# Patient Record
Sex: MALE | Race: WHITE | Employment: UNEMPLOYED | URBAN - METROPOLITAN AREA
[De-identification: names, ages, dates, MRNs, and addresses within clinical notes are randomized per-mention and may not be internally consistent; named-entity substitution may affect disease eponyms.]

---

## 2017-12-05 ENCOUNTER — APPOINTMENT (OUTPATIENT)
Dept: GENERAL RADIOLOGY | Age: 37
DRG: 861 | End: 2017-12-05
Payer: MEDICAID

## 2017-12-05 ENCOUNTER — APPOINTMENT (OUTPATIENT)
Dept: CT IMAGING | Age: 37
DRG: 861 | End: 2017-12-05
Payer: MEDICAID

## 2017-12-05 ENCOUNTER — HOSPITAL ENCOUNTER (INPATIENT)
Age: 37
LOS: 1 days | Discharge: HOME | DRG: 861 | End: 2017-12-06
Attending: EMERGENCY MEDICINE | Admitting: INTERNAL MEDICINE
Payer: MEDICAID

## 2017-12-05 ENCOUNTER — APPOINTMENT (OUTPATIENT)
Dept: MRI IMAGING | Age: 37
DRG: 861 | End: 2017-12-05
Payer: MEDICAID

## 2017-12-05 DIAGNOSIS — R53.1 LEFT-SIDED WEAKNESS: Primary | ICD-10-CM

## 2017-12-05 LAB
% CKMB: 1 % (ref 0–3.5)
ABSOLUTE EOS #: 0 K/UL (ref 0–0.4)
ABSOLUTE IMMATURE GRANULOCYTE: 0 K/UL (ref 0–0.3)
ABSOLUTE LYMPH #: 0.21 K/UL (ref 1–4.8)
ABSOLUTE MONO #: 0 K/UL (ref 0.1–0.8)
ALLEN TEST: ABNORMAL
AMPHETAMINE SCREEN URINE: NEGATIVE
ANION GAP SERPL CALCULATED.3IONS-SCNC: 15 MMOL/L (ref 9–17)
ANION GAP: 9 MMOL/L (ref 7–16)
BARBITURATE SCREEN URINE: NEGATIVE
BASOPHILS # BLD: 0 % (ref 0–2)
BASOPHILS ABSOLUTE: 0 K/UL (ref 0–0.2)
BENZODIAZEPINE SCREEN, URINE: NEGATIVE
BUN BLDV-MCNC: 15 MG/DL (ref 6–20)
BUN/CREAT BLD: ABNORMAL (ref 9–20)
BUPRENORPHINE URINE: ABNORMAL
CALCIUM SERPL-MCNC: 8.4 MG/DL (ref 8.6–10.4)
CANNABINOID SCREEN URINE: NEGATIVE
CHLORIDE BLD-SCNC: 96 MMOL/L (ref 98–107)
CK MB: 1.1 NG/ML
CKMB INTERPRETATION: ABNORMAL
CO2: 21 MMOL/L (ref 20–31)
COCAINE METABOLITE, URINE: POSITIVE
CREAT SERPL-MCNC: 0.77 MG/DL (ref 0.7–1.2)
DIFFERENTIAL TYPE: ABNORMAL
EKG ATRIAL RATE: 102 BPM
EKG P AXIS: 41 DEGREES
EKG P-R INTERVAL: 126 MS
EKG Q-T INTERVAL: 334 MS
EKG QRS DURATION: 88 MS
EKG QTC CALCULATION (BAZETT): 435 MS
EKG R AXIS: 40 DEGREES
EKG T AXIS: 11 DEGREES
EKG VENTRICULAR RATE: 102 BPM
EOSINOPHILS RELATIVE PERCENT: 0 % (ref 1–4)
ESTIMATED AVERAGE GLUCOSE: 120 MG/DL
FIO2: ABNORMAL
GFR AFRICAN AMERICAN: >60 ML/MIN
GFR NON-AFRICAN AMERICAN: >60 ML/MIN
GFR NON-AFRICAN AMERICAN: >60 ML/MIN
GFR SERPL CREATININE-BSD FRML MDRD: >60 ML/MIN
GFR SERPL CREATININE-BSD FRML MDRD: ABNORMAL ML/MIN/{1.73_M2}
GFR SERPL CREATININE-BSD FRML MDRD: ABNORMAL ML/MIN/{1.73_M2}
GFR SERPL CREATININE-BSD FRML MDRD: NORMAL ML/MIN/{1.73_M2}
GLUCOSE BLD-MCNC: 110 MG/DL (ref 74–100)
GLUCOSE BLD-MCNC: 99 MG/DL (ref 70–99)
HBA1C MFR BLD: 5.8 % (ref 4–6)
HCO3 VENOUS: 22.2 MMOL/L (ref 22–29)
HCT VFR BLD CALC: 47 % (ref 40.7–50.3)
HEMOGLOBIN: 15.6 G/DL (ref 13–17)
IMMATURE GRANULOCYTES: 0 %
INR BLD: 1.1
LYMPHOCYTES # BLD: 2 % (ref 24–44)
MCH RBC QN AUTO: 29.5 PG (ref 25.2–33.5)
MCHC RBC AUTO-ENTMCNC: 33.2 G/DL (ref 28.4–34.8)
MCV RBC AUTO: 89 FL (ref 82.6–102.9)
MDMA URINE: ABNORMAL
METHADONE SCREEN, URINE: NEGATIVE
METHAMPHETAMINE, URINE: ABNORMAL
MODE: ABNORMAL
MONOCYTES # BLD: 0 % (ref 1–7)
MORPHOLOGY: NORMAL
MYOGLOBIN: <21 NG/ML (ref 28–72)
NEGATIVE BASE EXCESS, VEN: 1 (ref 0–2)
O2 DEVICE/FLOW/%: ABNORMAL
O2 SAT, VEN: 86 % (ref 60–85)
OPIATES, URINE: NEGATIVE
OXYCODONE SCREEN URINE: NEGATIVE
PARTIAL THROMBOPLASTIN TIME: 31.8 SEC (ref 21.3–31.3)
PATIENT TEMP: ABNORMAL
PCO2, VEN: 33.3 MM HG (ref 41–51)
PDW BLD-RTO: 12.9 % (ref 11.8–14.4)
PH VENOUS: 7.43 (ref 7.32–7.43)
PHENCYCLIDINE, URINE: NEGATIVE
PLATELET # BLD: 211 K/UL (ref 138–453)
PLATELET ESTIMATE: ABNORMAL
PMV BLD AUTO: 10.5 FL (ref 8.1–13.5)
PO2, VEN: 48.8 MM HG (ref 30–50)
POC CHLORIDE: 104 MMOL/L (ref 98–107)
POC CREATININE: 0.93 MG/DL (ref 0.51–1.19)
POC HEMATOCRIT: 50 % (ref 41–53)
POC HEMOGLOBIN: 17.1 G/DL (ref 13.5–17.5)
POC IONIZED CALCIUM: 1.07 MMOL/L (ref 1.15–1.33)
POC LACTIC ACID: 1.26 MMOL/L (ref 0.56–1.39)
POC PCO2 TEMP: ABNORMAL MM HG
POC PH TEMP: ABNORMAL
POC PO2 TEMP: ABNORMAL MM HG
POC POTASSIUM: 3.8 MMOL/L (ref 3.5–4.5)
POC SODIUM: 135 MMOL/L (ref 138–146)
POSITIVE BASE EXCESS, VEN: ABNORMAL (ref 0–3)
POTASSIUM SERPL-SCNC: 3.8 MMOL/L (ref 3.7–5.3)
PROPOXYPHENE, URINE: ABNORMAL
PROTHROMBIN TIME: 11.4 SEC (ref 9.4–12.6)
RBC # BLD: 5.28 M/UL (ref 4.21–5.77)
RBC # BLD: ABNORMAL 10*6/UL
SAMPLE SITE: ABNORMAL
SEG NEUTROPHILS: 98 % (ref 36–66)
SEGMENTED NEUTROPHILS ABSOLUTE COUNT: 10.19 K/UL (ref 1.8–7.7)
SODIUM BLD-SCNC: 132 MMOL/L (ref 135–144)
TEST INFORMATION: ABNORMAL
TOTAL CK: 112 U/L (ref 39–308)
TOTAL CO2, VENOUS: 23 MMOL/L (ref 23–30)
TRICYCLIC ANTIDEPRESSANTS, UR: ABNORMAL
TROPONIN INTERP: ABNORMAL
TROPONIN T: <0.03 NG/ML
TSH SERPL DL<=0.05 MIU/L-ACNC: 0.65 MIU/L (ref 0.3–5)
WBC # BLD: 10.4 K/UL (ref 3.5–11.3)
WBC # BLD: ABNORMAL 10*3/UL

## 2017-12-05 PROCEDURE — 82435 ASSAY OF BLOOD CHLORIDE: CPT

## 2017-12-05 PROCEDURE — 85014 HEMATOCRIT: CPT

## 2017-12-05 PROCEDURE — 85610 PROTHROMBIN TIME: CPT

## 2017-12-05 PROCEDURE — 83036 HEMOGLOBIN GLYCOSYLATED A1C: CPT

## 2017-12-05 PROCEDURE — 99285 EMERGENCY DEPT VISIT HI MDM: CPT

## 2017-12-05 PROCEDURE — 6360000004 HC RX CONTRAST MEDICATION: Performed by: STUDENT IN AN ORGANIZED HEALTH CARE EDUCATION/TRAINING PROGRAM

## 2017-12-05 PROCEDURE — 70450 CT HEAD/BRAIN W/O DYE: CPT

## 2017-12-05 PROCEDURE — 70498 CT ANGIOGRAPHY NECK: CPT

## 2017-12-05 PROCEDURE — 6370000000 HC RX 637 (ALT 250 FOR IP): Performed by: HOSPITALIST

## 2017-12-05 PROCEDURE — 87040 BLOOD CULTURE FOR BACTERIA: CPT

## 2017-12-05 PROCEDURE — 6360000002 HC RX W HCPCS: Performed by: STUDENT IN AN ORGANIZED HEALTH CARE EDUCATION/TRAINING PROGRAM

## 2017-12-05 PROCEDURE — 99222 1ST HOSP IP/OBS MODERATE 55: CPT | Performed by: PSYCHIATRY & NEUROLOGY

## 2017-12-05 PROCEDURE — 2580000003 HC RX 258: Performed by: HOSPITALIST

## 2017-12-05 PROCEDURE — 71010 XR CHEST PORTABLE: CPT

## 2017-12-05 PROCEDURE — 82565 ASSAY OF CREATININE: CPT

## 2017-12-05 PROCEDURE — 84132 ASSAY OF SERUM POTASSIUM: CPT

## 2017-12-05 PROCEDURE — 85730 THROMBOPLASTIN TIME PARTIAL: CPT

## 2017-12-05 PROCEDURE — 85025 COMPLETE CBC W/AUTO DIFF WBC: CPT

## 2017-12-05 PROCEDURE — 80048 BASIC METABOLIC PNL TOTAL CA: CPT

## 2017-12-05 PROCEDURE — 84484 ASSAY OF TROPONIN QUANT: CPT

## 2017-12-05 PROCEDURE — 82803 BLOOD GASES ANY COMBINATION: CPT

## 2017-12-05 PROCEDURE — 83605 ASSAY OF LACTIC ACID: CPT

## 2017-12-05 PROCEDURE — 70551 MRI BRAIN STEM W/O DYE: CPT

## 2017-12-05 PROCEDURE — 80307 DRUG TEST PRSMV CHEM ANLYZR: CPT

## 2017-12-05 PROCEDURE — 84443 ASSAY THYROID STIM HORMONE: CPT

## 2017-12-05 PROCEDURE — 2060000000 HC ICU INTERMEDIATE R&B

## 2017-12-05 PROCEDURE — 70496 CT ANGIOGRAPHY HEAD: CPT

## 2017-12-05 PROCEDURE — 82550 ASSAY OF CK (CPK): CPT

## 2017-12-05 PROCEDURE — 2580000003 HC RX 258: Performed by: STUDENT IN AN ORGANIZED HEALTH CARE EDUCATION/TRAINING PROGRAM

## 2017-12-05 PROCEDURE — 82553 CREATINE MB FRACTION: CPT

## 2017-12-05 PROCEDURE — 83874 ASSAY OF MYOGLOBIN: CPT

## 2017-12-05 PROCEDURE — 84295 ASSAY OF SERUM SODIUM: CPT

## 2017-12-05 PROCEDURE — 93005 ELECTROCARDIOGRAM TRACING: CPT

## 2017-12-05 PROCEDURE — 82947 ASSAY GLUCOSE BLOOD QUANT: CPT

## 2017-12-05 PROCEDURE — 82330 ASSAY OF CALCIUM: CPT

## 2017-12-05 RX ORDER — SODIUM CHLORIDE 0.9 % (FLUSH) 0.9 %
10 SYRINGE (ML) INJECTION PRN
Status: DISCONTINUED | OUTPATIENT
Start: 2017-12-05 | End: 2017-12-06

## 2017-12-05 RX ORDER — MORPHINE SULFATE 4 MG/ML
4 INJECTION, SOLUTION INTRAMUSCULAR; INTRAVENOUS
Status: DISCONTINUED | OUTPATIENT
Start: 2017-12-05 | End: 2017-12-05

## 2017-12-05 RX ORDER — SODIUM CHLORIDE 0.9 % (FLUSH) 0.9 %
10 SYRINGE (ML) INJECTION PRN
Status: DISCONTINUED | OUTPATIENT
Start: 2017-12-05 | End: 2017-12-06 | Stop reason: HOSPADM

## 2017-12-05 RX ORDER — SODIUM CHLORIDE 0.9 % (FLUSH) 0.9 %
10 SYRINGE (ML) INJECTION EVERY 12 HOURS SCHEDULED
Status: DISCONTINUED | OUTPATIENT
Start: 2017-12-05 | End: 2017-12-06

## 2017-12-05 RX ORDER — ASPIRIN 325 MG
325 TABLET ORAL ONCE
Status: COMPLETED | OUTPATIENT
Start: 2017-12-05 | End: 2017-12-05

## 2017-12-05 RX ORDER — ASPIRIN 81 MG/1
81 TABLET ORAL DAILY
Status: DISCONTINUED | OUTPATIENT
Start: 2017-12-06 | End: 2017-12-06 | Stop reason: HOSPADM

## 2017-12-05 RX ORDER — SODIUM CHLORIDE 0.9 % (FLUSH) 0.9 %
10 SYRINGE (ML) INJECTION EVERY 12 HOURS SCHEDULED
Status: DISCONTINUED | OUTPATIENT
Start: 2017-12-05 | End: 2017-12-06 | Stop reason: HOSPADM

## 2017-12-05 RX ORDER — ATORVASTATIN CALCIUM 40 MG/1
40 TABLET, FILM COATED ORAL NIGHTLY
Status: DISCONTINUED | OUTPATIENT
Start: 2017-12-05 | End: 2017-12-06 | Stop reason: HOSPADM

## 2017-12-05 RX ORDER — MORPHINE SULFATE 2 MG/ML
2 INJECTION, SOLUTION INTRAMUSCULAR; INTRAVENOUS
Status: DISCONTINUED | OUTPATIENT
Start: 2017-12-05 | End: 2017-12-05

## 2017-12-05 RX ORDER — SODIUM CHLORIDE 0.9 % (FLUSH) 0.9 %
10 SYRINGE (ML) INJECTION EVERY 12 HOURS SCHEDULED
Status: DISCONTINUED | OUTPATIENT
Start: 2017-12-05 | End: 2017-12-05

## 2017-12-05 RX ORDER — ONDANSETRON 2 MG/ML
4 INJECTION INTRAMUSCULAR; INTRAVENOUS EVERY 6 HOURS PRN
Status: DISCONTINUED | OUTPATIENT
Start: 2017-12-05 | End: 2017-12-06 | Stop reason: HOSPADM

## 2017-12-05 RX ORDER — OXYCODONE HYDROCHLORIDE AND ACETAMINOPHEN 5; 325 MG/1; MG/1
2 TABLET ORAL EVERY 4 HOURS PRN
Status: DISCONTINUED | OUTPATIENT
Start: 2017-12-05 | End: 2017-12-05

## 2017-12-05 RX ORDER — ACETAMINOPHEN 325 MG/1
650 TABLET ORAL EVERY 4 HOURS PRN
Status: DISCONTINUED | OUTPATIENT
Start: 2017-12-05 | End: 2017-12-06

## 2017-12-05 RX ORDER — HYDROCODONE BITARTRATE AND ACETAMINOPHEN 5; 325 MG/1; MG/1
1 TABLET ORAL EVERY 6 HOURS PRN
Status: DISCONTINUED | OUTPATIENT
Start: 2017-12-05 | End: 2017-12-06 | Stop reason: HOSPADM

## 2017-12-05 RX ORDER — ACETAMINOPHEN 325 MG/1
650 TABLET ORAL EVERY 4 HOURS PRN
Status: DISCONTINUED | OUTPATIENT
Start: 2017-12-05 | End: 2017-12-05

## 2017-12-05 RX ORDER — ONDANSETRON 2 MG/ML
4 INJECTION INTRAMUSCULAR; INTRAVENOUS EVERY 8 HOURS PRN
Status: DISCONTINUED | OUTPATIENT
Start: 2017-12-05 | End: 2017-12-06

## 2017-12-05 RX ORDER — ACETAMINOPHEN 325 MG/1
650 TABLET ORAL EVERY 4 HOURS PRN
Status: DISCONTINUED | OUTPATIENT
Start: 2017-12-05 | End: 2017-12-06 | Stop reason: HOSPADM

## 2017-12-05 RX ORDER — OXYCODONE HYDROCHLORIDE AND ACETAMINOPHEN 5; 325 MG/1; MG/1
1 TABLET ORAL EVERY 4 HOURS PRN
Status: DISCONTINUED | OUTPATIENT
Start: 2017-12-05 | End: 2017-12-05

## 2017-12-05 RX ORDER — SODIUM CHLORIDE 0.9 % (FLUSH) 0.9 %
10 SYRINGE (ML) INJECTION PRN
Status: DISCONTINUED | OUTPATIENT
Start: 2017-12-05 | End: 2017-12-05

## 2017-12-05 RX ORDER — SODIUM CHLORIDE 9 MG/ML
INJECTION, SOLUTION INTRAVENOUS CONTINUOUS
Status: DISCONTINUED | OUTPATIENT
Start: 2017-12-05 | End: 2017-12-06 | Stop reason: HOSPADM

## 2017-12-05 RX ADMIN — SODIUM CHLORIDE: 9 INJECTION, SOLUTION INTRAVENOUS at 17:50

## 2017-12-05 RX ADMIN — TAZOBACTAM SODIUM AND PIPERACILLIN SODIUM 3.38 G: 375; 3 INJECTION, SOLUTION INTRAVENOUS at 17:49

## 2017-12-05 RX ADMIN — VANCOMYCIN HYDROCHLORIDE 1250 MG: 1 INJECTION, POWDER, LYOPHILIZED, FOR SOLUTION INTRAVENOUS at 19:12

## 2017-12-05 RX ADMIN — Medication 10 ML: at 21:12

## 2017-12-05 RX ADMIN — IOPAMIDOL 90 ML: 755 INJECTION, SOLUTION INTRAVENOUS at 17:04

## 2017-12-05 RX ADMIN — ASPIRIN 325 MG: 325 TABLET, COATED ORAL at 17:50

## 2017-12-05 RX ADMIN — ENOXAPARIN SODIUM 40 MG: 40 INJECTION SUBCUTANEOUS at 22:49

## 2017-12-05 ASSESSMENT — ENCOUNTER SYMPTOMS
COLOR CHANGE: 0
COUGH: 0
PHOTOPHOBIA: 0
DIARRHEA: 0
NAUSEA: 0
BLOOD IN STOOL: 0
SINUS PAIN: 0
CHOKING: 0
WHEEZING: 0
CONSTIPATION: 0
VOICE CHANGE: 0
ABDOMINAL DISTENTION: 0
CHEST TIGHTNESS: 0
SHORTNESS OF BREATH: 0
SINUS PRESSURE: 0
ABDOMINAL PAIN: 0
TROUBLE SWALLOWING: 0
SORE THROAT: 0
VOMITING: 0

## 2017-12-05 ASSESSMENT — PAIN SCALES - GENERAL
PAINLEVEL_OUTOF10: 0
PAINLEVEL_OUTOF10: 0

## 2017-12-05 NOTE — ED PROVIDER NOTES
[facility] [address] [facility]  Emergency Department  Emergency Medicine Resident Sign-out     Care of Mame Villanueva was assumed from Dr. Lori Lind and is being seen for Fatigue  . The patient's initial evaluation and plan have been discussed with the prior provider who initially evaluated the patient.      EMERGENCY DEPARTMENT COURSE / MEDICAL DECISION MAKING:       MEDICATIONS GIVEN:  Orders Placed This Encounter   Medications    iopamidol (ISOVUE-370) 76 % injection 90 mL    sodium chloride flush 0.9 % injection 10 mL    sodium chloride flush 0.9 % injection 10 mL    acetaminophen (TYLENOL) tablet 650 mg    magnesium hydroxide (MILK OF MAGNESIA) 400 MG/5ML suspension 30 mL    ondansetron (ZOFRAN) injection 4 mg    enoxaparin (LOVENOX) injection 40 mg    aspirin EC tablet 81 mg    atorvastatin (LIPITOR) tablet 40 mg    aspirin tablet 325 mg    0.9 % sodium chloride infusion    piperacillin-tazobactam (ZOSYN) 3.375 g in dextrose 50 mL IVPB (premix)    vancomycin (VANCOCIN) 1,250 mg in dextrose 5 % 250 mL IVPB    vancomycin (VANCOCIN) intermittent dosing (placeholder)       LABS / RADIOLOGY:     Labs Reviewed   SODIUM (POC) - Abnormal; Notable for the following:        Result Value    POC Sodium 135 (*)     All other components within normal limits   CALCIUM, IONIC (POC) - Abnormal; Notable for the following:     POC Ionized Calcium 1.07 (*)     All other components within normal limits   STROKE PANEL - Abnormal; Notable for the following:     Seg Neutrophils 98 (*)     Lymphocytes 2 (*)     Monocytes 0 (*)     Eosinophils % 0 (*)     Segs Absolute 10.19 (*)     Absolute Lymph # 0.21 (*)     Absolute Mono # 0.00 (*)     PTT 31.8 (*)     Myoglobin <21 (*)     Calcium 8.4 (*)     Sodium 132 (*)     Chloride 96 (*)     All other components within normal limits   URINE DRUG SCREEN - Abnormal; Notable for the following:     Cocaine Metabolite, Urine POSITIVE (*)     All other components within normal limits images are provided for review. Stenosis of the internal carotid arteries measured using NASCET criteria. Dose modulation, iterative reconstruction, and/or weight based adjustment of the mA/kV was utilized to reduce the radiation dose to as low as reasonably achievable. COMPARISON: None. HISTORY: ORDERING SYSTEM PROVIDED HISTORY: STROKE Initial encounter. Acute illness. Acute onset left-sided weakness. FINDINGS: CTA NECK: AORTIC ARCH/GREAT VESSELS: There is a normal branch pattern of the aortic arch. No significant stenosis is seen of the innominate artery or subclavian arteries. CAROTID ARTERIES: There is mild atherosclerotic plaque at the carotid bifurcations causing less than 25% stenosis of the proximal internal carotid arteries. The common, internal, and external carotid arteries are otherwise normal in course caliber without dissection, pseudoaneurysm, or thrombosis. VERTEBRAL ARTERIES: The vertebral arteries both arise from the subclavian arteries and are normal in caliber without evidence of flow limiting stenosis or dissection. SOFT TISSUES: The lung apices are clear. No cervical or superior mediastinal lymphadenopathy. The visualized portion of the larynx and pharynx appear unremarkable. The parotid, submandibular and thyroid glands demonstrate no acute abnormality. BONES: There is no acute fracture or suspect osseous lesion. Mild degenerative changes are present in the cervical spine greatest at C5-6. CTA HEAD: ANTERIOR CIRCULATION: The internal carotid arteries are normal in course and caliber without focal stenosis. The anterior cerebral and middle cerebral arteries demonstrate no focal stenosis. POSTERIOR CIRCULATION: The posterior cerebral arteries demonstrate no focal stenosis. The vertebral and basilar arteries appear unremarkable. Bilateral posterior communicating arteries are present. ANEURYSM: No intracranial aneurysm is seen.  BRAIN: No abnormal intracranial enhancement or vascular malformation. 1. No acute arterial abnormality or hemodynamically significant arterial stenosis in the head or neck. 2. Mild atherosclerotic plaque at the carotid bifurcations causing less than 25% stenosis of the proximal internal carotid arteries. Findings were discussed with Dr. Jeni Crocker at 5:28 pm on 12/5/2017. RECENT VITALS:     Temp: 99.5 °F (37.5 °C),  Pulse: 101, Resp: 24, BP: (!) 150/100, SpO2: 98 %    This patient is a 30 y.o. Male with Stroke like symptoms. Patient was followed by the stroke team.  He has CT scan of the head, CT of the head and neck without any acute findings. Patient has been admitted to medicine for further stroke workup plan is for MRI. Patient had a recent dental procedure and was on penicillin. [address] that this may be an emboli secondary to endocarditis and easily get an echocardiogram and further workup concerned that as well. He was given Zosyn here in the emergency department. Daily dose vancomycin. We'll continue to monitor on the patient goes upstairs. ED Course        OUTSTANDING TASKS / RECOMMENDATIONS:    1. Abdomen no outstanding labs, MRI pending  2. FINAL IMPRESSION:     1. Left-sided weakness        DISPOSITION:         DISPOSITION:  []  Discharge   []  Transfer -    [x]  Admission -  Internal medicine   []  Against Medical Advice   []  Eloped   FOLLOW-UP: No follow-up provider specified.    DISCHARGE MEDICATIONS: New Prescriptions    No medications on file          Justin Rivas MD  Emergency Medicine Resident  [id number] Zach Vaughan MD  Resident  12/05/17 Robina Mcdonald

## 2017-12-05 NOTE — ED NOTES
Bed: 24  Expected date:   Expected time:   Means of arrival:   Comments:     Eunice Mendez RN  12/05/17 [id number]

## 2017-12-05 NOTE — ED PROVIDER NOTES
level 4, 10 or more for level 5)      Review of Systems   Constitutional: Negative for activity change, appetite change, fatigue and fever. HENT: Negative for congestion, sinus pain, sinus pressure, sneezing, sore throat, trouble swallowing and voice change. Eyes: Negative for photophobia and visual disturbance. Respiratory: Negative for cough, choking, chest tightness, shortness of breath and wheezing. Cardiovascular: Negative for chest pain and palpitations. Gastrointestinal: Negative for abdominal distention, abdominal pain, blood in stool, constipation, diarrhea, nausea and vomiting. Endocrine: Negative for polydipsia, polyphagia and polyuria. Genitourinary: Negative for difficulty urinating, dysuria, flank pain, frequency and urgency. Musculoskeletal: Negative for arthralgias, gait problem, joint swelling, myalgias and neck stiffness. Skin: Negative for color change and pallor. Neurological: Positive for dizziness and weakness. Negative for syncope, facial asymmetry, light-headedness, numbness and headaches. PHYSICAL EXAM   (up to 7 for level 4, 8 or more for level 5)      BP (!) 150/100   Pulse 101   Temp 99.5 °F (37.5 °C) (Oral)   Resp 24   Ht 6' (1.829 m)   Wt 225 lb (102.1 kg)   SpO2 98%   BMI 30.52 kg/m²     Physical Exam   Constitutional: He is oriented to person, place, and time. He appears well-developed and well-nourished. No distress. HENT:   Head: Normocephalic and atraumatic. Right Ear: External ear normal.   Left Ear: External ear normal.   Eyes: EOM are normal. Pupils are equal, round, and reactive to light. Neck: Normal range of motion. No JVD present. No tracheal deviation present. Cardiovascular: Normal rate, normal heart sounds and intact distal pulses. Exam reveals no gallop and no friction rub. No murmur heard. Pulmonary/Chest: Effort normal and breath sounds normal. No respiratory distress. He has no wheezes. Abdominal: Soft.  He exhibits no distension. There is no tenderness. Musculoskeletal: Normal range of motion. He exhibits no deformity. We decided to both left upper and lower extremities - 4+ with 5+ strength on the right side. Neurological: He is alert and oriented to person, place, and time. Skin: Skin is warm and dry. He is not diaphoretic. DIFFERENTIAL  DIAGNOSIS     PLAN (LABS / IMAGING / EKG):  Orders Placed This Encounter   Procedures    CULTURE BLOOD #2    CULTURE BLOOD #1    CULTURE BLOOD #1    CT HEAD WO CONTRAST    CTA HEAD W CONTRAST    CTA NECK W CONTRAST    XR Chest Portable    Hemoglobin and hematocrit, blood    SODIUM (POC)    POTASSIUM (POC)    CHLORIDE (POC)    CALCIUM, IONIC (POC)    STROKE PANEL    Lipid panel - fasting    Troponin    TSH with Reflex    LIPID PANEL    HEMOGLOBIN A1C    DRUG SCREEN MULTI URINE    Vital signs    Telemetry monitoring    Neuro checks    Tobacco cessation education    Swallow screen by nursing before diet and oral medications started.     Stroke education    Vital signs per unit routine    Notify Physician    Elevate Head of Bed     Nursing swallow assessment    Nursing communication    Full Code    Inpatient consult to Neurology    Inpatient Consult to Neurology    Pharmacy to Dose: Vancomycin    Inpatient consult to Internal Medicine    OT eval and treat    PT evaluation and treat    Initiate Oxygen Therapy Protocol    Venous Blood Gas, POC    Creatinine W/GFR Point of Care    Lactic Acid, POC    POCT Glucose    Anion Gap (Calc) POC    POCT troponin    EKG 12 Lead    ECHO Complete 2D W Doppler W Color    ECHO Complete 2D W Doppler W Color       MEDICATIONS ORDERED:  Orders Placed This Encounter   Medications    iopamidol (ISOVUE-370) 76 % injection 90 mL    sodium chloride flush 0.9 % injection 10 mL    sodium chloride flush 0.9 % injection 10 mL    acetaminophen (TYLENOL) tablet 650 mg    magnesium hydroxide (MILK OF MAGNESIA) 400 MG/5ML suspension 30 mL    ondansetron (ZOFRAN) injection 4 mg    enoxaparin (LOVENOX) injection 40 mg    aspirin EC tablet 81 mg    atorvastatin (LIPITOR) tablet 40 mg    aspirin tablet 325 mg    0.9 % sodium chloride infusion    piperacillin-tazobactam (ZOSYN) 3.375 g in dextrose 50 mL IVPB (premix)       DDX:     AMS DDX:   Evaluate for ingestion, infectious, trauma, seizure, AMS, electrolytes, encephalopathy, insulin, opiates, uremia, toxins, tumor, thyrotoxicosis, psychiatric, sepsis and stroke. Stroke/ TIA/ Facial Droop DDX:   Stroke/ TIA, Vascular, Infectious/inflammatory, Neoplastic/neurological, Drug induced, Iatrogenic, Cardiopulmonary, Autoimmune, Endocrine, Degenerative, Psychogenic/psychiatric, MSK - special concern for endocarditis    INITIAL NIH STROKE SCALE:    Time Performed:  9815 PM     1a. Level of consciousness:  0 - alert; keenly responsive  1b. Level of consciousness questions:  0 - answers both questions correctly  1c. Level of consciousness questions:  0 - performs both tasks correctly  2. Best Gaze:  0 - normal  3. Visual:  0 - no visual loss  4. Facial Palsy:  0 - normal symmetric movement  5a. Motor left arm:  1 - drift, limb holds 90 (or 45) degrees but drifts down before full 10 seconds: does not hit bed  5b. Motor right arm:  0 - no drift, limb holds 90 (or 45) degrees for full 10 seconds  6a. Motor left le - drift; leg falls by the end of the 5 second period but does not hit bed  6b. Motor right le - no drift; leg holds 30 degree position for full 5 seconds  7. Limb Ataxia:  0 - absent  8. Sensory:  0 - normal; no sensory loss  9. Best Language:  0 - no aphasia, normal  10. Dysarthria:  0 - normal  11.   Extinction and Inattention:  0 - no abnormality    TOTAL:  2    DIAGNOSTIC RESULTS / EMERGENCY DEPARTMENT COURSE / MDM     LABS:  Results for orders placed or performed during the hospital encounter of 17   Hemoglobin and hematocrit, HISTORY: ORDERING SYSTEM PROVIDED HISTORY: STROKE TECHNOLOGIST PROVIDED HISTORY: Has a \"code stroke\" or \"stroke alert\" been called? ->Yes Initial encounter. Acute illness. Acute onset left upper and lower extremity weakness. FINDINGS: BRAIN/VENTRICLES: There is no acute intracranial hemorrhage, mass effect or midline shift. No abnormal extra-axial fluid collection. The gray-white differentiation is maintained without evidence of an acute infarct. There is no evidence of hydrocephalus. ORBITS: The visualized portion of the orbits demonstrate no acute abnormality. SINUSES: The visualized paranasal sinuses and mastoid air cells demonstrate no acute abnormality. SOFT TISSUES/SKULL:  No acute abnormality of the visualized skull or soft tissues. No acute intracranial abnormality. Findings were discussed with Dr. Marcella Luu at 5:09 pm on 12/5/2017. Xr Chest Portable    Result Date: 12/5/2017  EXAMINATION: SINGLE VIEW OF THE CHEST 12/5/2017 5:00 pm COMPARISON: None. HISTORY: ORDERING SYSTEM PROVIDED HISTORY: chest pain, stroke priority TECHNOLOGIST PROVIDED HISTORY: Reason for exam:->chest pain, stroke priority FINDINGS: The heart size at the upper limits of normal.  Bibasilar hypoaeration. No pulmonary edema. No pleural effusion. 1. Bibasilar hypoaeration 2. Otherwise, no acute abnormalities seen in the chest     Cta Neck W Contrast    Result Date: 12/5/2017  EXAMINATION: CTA OF THE HEAD WITH CONTRAST; CTA OF THE NECK 12/5/2017 5:18 pm: TECHNIQUE: CTA of the head/brain was performed with the administration of intravenous contrast. Multiplanar reformatted images are provided for review. MIP images are provided for review.  Dose modulation, iterative reconstruction, and/or weight based adjustment of the mA/kV was utilized to reduce the radiation dose to as low as reasonably achievable.; CTA of the neck was performed with the administration of intravenous contrast. Multiplanar reformatted images are provided for review. MIP images are provided for review. Stenosis of the internal carotid arteries measured using NASCET criteria. Dose modulation, iterative reconstruction, and/or weight based adjustment of the mA/kV was utilized to reduce the radiation dose to as low as reasonably achievable. COMPARISON: None. HISTORY: ORDERING SYSTEM PROVIDED HISTORY: STROKE Initial encounter. Acute illness. Acute onset left-sided weakness. FINDINGS: CTA NECK: AORTIC ARCH/GREAT VESSELS: There is a normal branch pattern of the aortic arch. No significant stenosis is seen of the innominate artery or subclavian arteries. CAROTID ARTERIES: There is mild atherosclerotic plaque at the carotid bifurcations causing less than 25% stenosis of the proximal internal carotid arteries. The common, internal, and external carotid arteries are otherwise normal in course caliber without dissection, pseudoaneurysm, or thrombosis. VERTEBRAL ARTERIES: The vertebral arteries both arise from the subclavian arteries and are normal in caliber without evidence of flow limiting stenosis or dissection. SOFT TISSUES: The lung apices are clear. No cervical or superior mediastinal lymphadenopathy. The visualized portion of the larynx and pharynx appear unremarkable. The parotid, submandibular and thyroid glands demonstrate no acute abnormality. BONES: There is no acute fracture or suspect osseous lesion. Mild degenerative changes are present in the cervical spine greatest at C5-6. CTA HEAD: ANTERIOR CIRCULATION: The internal carotid arteries are normal in course and caliber without focal stenosis. The anterior cerebral and middle cerebral arteries demonstrate no focal stenosis. POSTERIOR CIRCULATION: The posterior cerebral arteries demonstrate no focal stenosis. The vertebral and basilar arteries appear unremarkable. Bilateral posterior communicating arteries are present. ANEURYSM: No intracranial aneurysm is seen.  BRAIN: No abnormal intracranial enhancement or vascular malformation. 1. No acute arterial abnormality or hemodynamically significant arterial stenosis in the head or neck. 2. Mild atherosclerotic plaque at the carotid bifurcations causing less than 25% stenosis of the proximal internal carotid arteries. Findings were discussed with Dr. Khari Weir at 5:28 pm on 12/5/2017. Cta Head W Contrast    Result Date: 12/5/2017  EXAMINATION: CTA OF THE HEAD WITH CONTRAST; CTA OF THE NECK 12/5/2017 5:18 pm: TECHNIQUE: CTA of the head/brain was performed with the administration of intravenous contrast. Multiplanar reformatted images are provided for review. MIP images are provided for review. Dose modulation, iterative reconstruction, and/or weight based adjustment of the mA/kV was utilized to reduce the radiation dose to as low as reasonably achievable.; CTA of the neck was performed with the administration of intravenous contrast. Multiplanar reformatted images are provided for review. MIP images are provided for review. Stenosis of the internal carotid arteries measured using NASCET criteria. Dose modulation, iterative reconstruction, and/or weight based adjustment of the mA/kV was utilized to reduce the radiation dose to as low as reasonably achievable. COMPARISON: None. HISTORY: ORDERING SYSTEM PROVIDED HISTORY: STROKE Initial encounter. Acute illness. Acute onset left-sided weakness. FINDINGS: CTA NECK: AORTIC ARCH/GREAT VESSELS: There is a normal branch pattern of the aortic arch. No significant stenosis is seen of the innominate artery or subclavian arteries. CAROTID ARTERIES: There is mild atherosclerotic plaque at the carotid bifurcations causing less than 25% stenosis of the proximal internal carotid arteries. The common, internal, and external carotid arteries are otherwise normal in course caliber without dissection, pseudoaneurysm, or thrombosis.  VERTEBRAL ARTERIES: The vertebral arteries both arise from the subclavian arteries and are normal in caliber without evidence of flow limiting stenosis or dissection. SOFT TISSUES: The lung apices are clear. No cervical or superior mediastinal lymphadenopathy. The visualized portion of the larynx and pharynx appear unremarkable. The parotid, submandibular and thyroid glands demonstrate no acute abnormality. BONES: There is no acute fracture or suspect osseous lesion. Mild degenerative changes are present in the cervical spine greatest at C5-6. CTA HEAD: ANTERIOR CIRCULATION: The internal carotid arteries are normal in course and caliber without focal stenosis. The anterior cerebral and middle cerebral arteries demonstrate no focal stenosis. POSTERIOR CIRCULATION: The posterior cerebral arteries demonstrate no focal stenosis. The vertebral and basilar arteries appear unremarkable. Bilateral posterior communicating arteries are present. ANEURYSM: No intracranial aneurysm is seen. BRAIN: No abnormal intracranial enhancement or vascular malformation. 1. No acute arterial abnormality or hemodynamically significant arterial stenosis in the head or neck. 2. Mild atherosclerotic plaque at the carotid bifurcations causing less than 25% stenosis of the proximal internal carotid arteries. Findings were discussed with Dr. Marina Geiger at 5:28 pm on 12/5/2017.      EKG  Rhythm: normal sinus   Rate: normal  Axis: normal  Ectopy: none  Conduction: normal  ST Segments: no acute change  T Waves: no acute change  Q Waves: none    Clinical Impression: no acute changes and non-specific EKG    Karly Rendon MD  Emergency Medicine Resident    All EKG's are interpreted by the Emergency Department Physician who either signs or Co-signs this chart in the absence of a cardiologist.    EMERGENCY DEPARTMENT COURSE:  Physical exam  Stroke priority  CT head with CTA head and neck  Echocardiogram  Blood cultures x 3  Antibiotics for endocarditis risk  Admission to neuro stepdown per medicine  MRI brain    PROCEDURES:  None    CONSULTS:  IP CONSULT TO NEUROLOGY  IP CONSULT TO NEUROLOGY  PHARMACY TO DOSE VANCOMYCIN  IP CONSULT TO INTERNAL MEDICINE    FINAL IMPRESSION      1.  Left-sided weakness           DISPOSITION / PLAN     Disposition: Neuro stepdown admission      Marianne Mcclendon MD  Emergency Medicine Resident    (Please note that portions of this note were completed with a voice recognition program.  Efforts were made to edit the dictations but occasionally words are mis-transcribed.)       Leonel Ignacio MD  Resident  12/05/17 [id number]

## 2017-12-05 NOTE — CONSULTS
Neuro Critical Care   []Stroke Alert  []Stroke Priority  []Stroke Consult   paged @   ER Room # 24  Arrival to patient bedside @       Pt Name: Clarence Gallardo  MRN: [id number]  YOB: 1980  Allergies:has No Known Allergies. Primary Care Physician: No primary care provider on file. Clarence Gallardo is a 30 y.o. male who presents with stroke like symptoms with weakness on left side of his body. Apparently patient was sick last night took tylenol and didn't get sleep all night. He woke this morning had breakfast last well known at around 9.00 am in morning, was driving to [place], he felt dizzy , nauseated and threw up , later he felt as if he lost control over his body and pulled his car to a side. He lost consciousness and woke 4 hrs later to find his left side of body weak. A stroke priority was called. Symptom onset has been acute for a time period of  minute(s). Severity is described as moderate. Course of his symptoms over time is insidious. Had a dental procedure last Saturday. Past Medical History   has no past medical history on file. Imaging:  CT brain without contrast: No hemorrhage    CTA/CTP imaging: no large vessel oclussion    MRI brain  without contrast: pending      Assessment  1. Last Known Well (date and time): 9.00 am    2. Candidate for IV tPA therapy:     Yes []     No  [x] due to the following exclusion criteria:    3. Candidate for Thormbectomy:    Yes[]      No [x] due to the following exclusion criteria:     4. INITIAL NIH STROKE SCALE    1a.  Level of consciousness:  0 - alert; keenly responsive    1b. Level of consciousness questions:  1 - answers one question correctly    1c. Level of consciousness questions:  0 - performs both tasks correctly    2. Best Gaze:  0 - normal    3. Visual:  0 - no visual loss    4. Facial Palsy:  0 - normal symmetric movement    5a.   Motor left arm:  1 - drift, limb holds 90 (or 45) degrees but drifts down before full 10 seconds: does []FOR NIHSS 7 or LESS post IV tPA administration:  - Vital signs every 15 minutes x 2 hours then every 30 minutes x 6 hours then every hour for 4 hours. - Neurological checks every 15 minutes x 2 hours then every 30 minutes x 6 hours then every hour for 4 hours.   - NIHSS documented every hour x 3 hours then daily AND PRN for acute changes. MRI head without contrast - limited stroke evaluation  0.9% NS infusion at 100 ml/hour to start after initial bolus.  mg once today  ASA 81 mg daily starting   Lipitor 40 mg  Goal -180 mm Hg  Bed side swallow eval  Carotid dopplers  2D cardiac echo  Physical Therapy  Occupational Therapy  Physical Medicine and Rehab evaluation for discharge planning. Speech Therapy   Neurology consult   Fasting Lipid panel  Blood cultures 1 and 2 and starting empiric antibiotics per ED, possible endocarditis.   Urine drug screen  Hgb A1c  EPC cuffs    Discussed with Dr. Filiberto Jones    Electronically signed by Suri Vera MD on 12/5/17 at 4:40 PM  [address] [place] [address]

## 2017-12-05 NOTE — PROGRESS NOTES
VIA [facility] Stroke Dysphagia screen  To be completed on all patients upon admission with diagnosis of stroke. If any of the following questions are answered with a yes, stop and refer to speech pathology. Yes No   1. Is the Glascow Coma Scale LESS than 13?    x   2. Is there Facial Asymmetry Weakness?    x   3. Is there Tongue Asymmetry Weakness?    x   4. Is there Palatal Asymmetry Weakness?    x   5. Are there signs of aspiration during the 3oz water test?    x   -If all findings for the first 4 questions are NO, proceed to the 3 oz. water test.  -Administer 3oz of water for sequential drinks, note any throat clearing, cough or change in vocal quality      immediately after and 1 min following the swallow. If clearing, coughing in vocal quality is noted, refer to speech   therapy. -If all of the answers to the above question are NO, then start the patient on a regular. Nursing Swallow Screen   **Complete swallow screen BEFORE any PO intake, including medications**  Have patient in highest tolerable sitting position. Have oral suction equipment available at bedside. Explain testing and rationale to patient. Is the patient alert and can follow simple commands? If NO stop   Does the patient have a clear strong voice and can vocalize on command? If NO stop   Is the patient's speech SEVERELY slurred or garbled? If YES stop   Can the patient voluntarily cough two times? If NO stop   Is the patient able to maintain own secretions? If NO stop   Is the patient is able to swallow 60ml of water without throat clearing, choking, gurgling, coughing, dribbling, or drooling? If NO stop   Is the patient able to swallow 60ml of water from a STRAW without throat clearing, choking, gurgling, coughing, dribbling, or drooling? If NO stop   If patient is unable to complete swallow screen successfully, patient should remain NPO until further swallow evaluation is completed by speech therapy.

## 2017-12-05 NOTE — PROGRESS NOTES
Pharmacy Note  Vancomycin Consult    Franca Gallegos is a 30 y.o. male started on Vancomycin for endocarditis risk; consult received from Dr. Zoë Frey to manage therapy. Also receiving the following antibiotics: Zosyn. Patient Active Problem List   Diagnosis    Acute left-sided weakness       Allergies:  Review of patient's allergies indicates no known allergies. Temp max: 99.5    Recent Labs      12/05/17   1710   BUN  15       Recent Labs      12/05/17   1637  12/05/17   1710   CREATININE  0.93  0.77       Recent Labs      12/05/17   1710   WBC  10.4       No intake or output data in the 24 hours ending 12/05/17 1856    Culture Date      Source                       Results  12/5/17                Blood x 3                    Collected    Ht Readings from Last 1 Encounters:   12/05/17 6' (1.829 m)        Wt Readings from Last 1 Encounters:   12/05/17 225 lb (102.1 kg)         Body mass index is 30.52 kg/m². Estimated Creatinine Clearance: 166 mL/min (based on SCr of 0.77 mg/dL). Assessment/Plan:  Will initiate vancomycin 1250 mg IV every 8 hours. Timing of trough level will be determined based on culture results, renal function, and clinical response. Thank you for the consult. Will continue to follow.   Jose Stevens, PharmD, BCPS  12/5/2017  6:59 PM

## 2017-12-05 NOTE — FLOWSHEET NOTE
12/05/17 1625   Encounter Summary   Services provided to: Patient   Referral/Consult From: Multi-disciplinary team   Support System Family members   Continue Visiting (12/5/2017)   Complexity of Encounter High   Length of Encounter 30 minutes   Routine   Type Initial   Intervention Active listening;Explored feelings, thoughts, concerns;Sustaining presence/ Ministry of presence;Prayer   Outcome Tearful   Crisis   Type Stroke Alert   Assessment Approachable;Tearful;Grieving;Coping  (Weak)   Intervention Active listening;Explored feelings, thoughts, concerns;Prayer;Sustaining presence/ Ministry of presence   Outcome Acceptance;Comfort;Tearful     SPIRITUAL CARE DEPARTMENT - [facility]     Emergency/Trauma Note    PATIENT NAME: Maribel Koenig    Shift date: December 5, 2017  Shift day: Tuesday   Shift # 2    Room # 24/24   Name: Maribel Koenig            Age: 30 y.o. Gender: male          Taoist: No Holiness on file None  Place of Gnosticism:     Trauma/Incident type: Stroke Alert  Admit Date & Time: 12/5/2017  4:22 PM  TRAUMA NAME: [facility]        PATIENT/EVENT DESCRIPTION:  Maribel Koenig is a 30 y.o. male who arrived via Ambulance to Emergency Department at [facility] at 4:22PM.       SPIRITUAL ASSESSMENT/INTERVENTION:  Kaushik Del Cid was notified by smartphone at 4:25PM.  Cristina Rodrigez immediately went to the Emergency Room and entered the patient's room which was room 24. The medical staff was examining the patient to test for a stroke. When staff was finished,  offered to contact the patient's family. The patient said sure, and to to call Eufemia Antonio. Eufemia Antonio was contacted and informed the  that she and her family would be there in about three hours. Patient was then notified, and shared appreciation for the help. Prayer was then offered and administered to the patient. Later, the patient's family called again and wanted to speak to the patient.   The family was not able to speak to the patient

## 2017-12-05 NOTE — ED PROVIDER NOTES
[address] [facility]     Emergency Department     Faculty Attestation    I performed a history and physical examination of the patient and discussed management with the resident. I reviewed the residents note and agree with the documented findings including all diagnostic interpretations and plan of care. Any areas of disagreement are noted on the chart. I was personally present for the key portions of any procedures. I have documented in the chart those procedures where I was not present during the key portions. I have reviewed the emergency nurses triage note. I agree with the chief complaint, past medical history, past surgical history, allergies, medications, social and family history as documented unless otherwise noted below. Documentation of the HPI, Physical Exam and Medical Decision Making performed by scribmallorie is based on my personal performance of the HPI, PE and MDM. For Physician Assistant/ Nurse Practitioner cases/documentation I have personally evaluated this patient and have completed at least one if not all key elements of the E/M (history, physical exam, and MDM). Additional findings are as noted. Primary Care Physician: No primary care provider on file. History: This is a 30 y.o. male who presents to the Emergency Department with complaint of left-sided weakness. Patient woke up at 10:00 with nausea, did have chest pain initially. About 30 minutes after waking up and getting onto the road he felt weakness on his left side. He was pulled over to the side of the road and found by [employer] acting abnormally. No prior history of hypertension, stroke, coronary artery disease. Has a family history of coronary artery disease. Physical:     vitals were not taken for this visit.    30 y.o. male ill, nontoxic-appearing, oropharynx clear and moist, cardiac exam regular rate and rhythm no murmurs or gallops complications clear to auscultation symptoms. 3 blood cultures ordered and empiric treatment of endocarditis has been ordered.      Mouna Brody MD  12/05/17 1360

## 2017-12-06 VITALS
HEART RATE: 83 BPM | HEIGHT: 68 IN | OXYGEN SATURATION: 96 % | SYSTOLIC BLOOD PRESSURE: 129 MMHG | BODY MASS INDEX: 33.58 KG/M2 | DIASTOLIC BLOOD PRESSURE: 79 MMHG | WEIGHT: 221.56 LBS | TEMPERATURE: 97.3 F | RESPIRATION RATE: 16 BRPM

## 2017-12-06 LAB
ANION GAP SERPL CALCULATED.3IONS-SCNC: 12 MMOL/L (ref 9–17)
BUN BLDV-MCNC: 8 MG/DL (ref 6–20)
BUN/CREAT BLD: ABNORMAL (ref 9–20)
CALCIUM SERPL-MCNC: 8.5 MG/DL (ref 8.6–10.4)
CHLORIDE BLD-SCNC: 100 MMOL/L (ref 98–107)
CO2: 22 MMOL/L (ref 20–31)
CREAT SERPL-MCNC: 0.84 MG/DL (ref 0.7–1.2)
GFR AFRICAN AMERICAN: >60 ML/MIN
GFR NON-AFRICAN AMERICAN: >60 ML/MIN
GFR SERPL CREATININE-BSD FRML MDRD: ABNORMAL ML/MIN/{1.73_M2}
GFR SERPL CREATININE-BSD FRML MDRD: ABNORMAL ML/MIN/{1.73_M2}
GLUCOSE BLD-MCNC: 102 MG/DL (ref 70–99)
LV EF: 55 %
LVEF MODALITY: NORMAL
POTASSIUM SERPL-SCNC: 3.8 MMOL/L (ref 3.7–5.3)
SODIUM BLD-SCNC: 134 MMOL/L (ref 135–144)

## 2017-12-06 PROCEDURE — 99222 1ST HOSP IP/OBS MODERATE 55: CPT | Performed by: PSYCHIATRY & NEUROLOGY

## 2017-12-06 PROCEDURE — 93306 TTE W/DOPPLER COMPLETE: CPT

## 2017-12-06 PROCEDURE — 80048 BASIC METABOLIC PNL TOTAL CA: CPT

## 2017-12-06 PROCEDURE — G0008 ADMIN INFLUENZA VIRUS VAC: HCPCS | Performed by: STUDENT IN AN ORGANIZED HEALTH CARE EDUCATION/TRAINING PROGRAM

## 2017-12-06 PROCEDURE — 6360000002 HC RX W HCPCS: Performed by: STUDENT IN AN ORGANIZED HEALTH CARE EDUCATION/TRAINING PROGRAM

## 2017-12-06 PROCEDURE — 36415 COLL VENOUS BLD VENIPUNCTURE: CPT

## 2017-12-06 PROCEDURE — G8978 MOBILITY CURRENT STATUS: HCPCS

## 2017-12-06 PROCEDURE — 97530 THERAPEUTIC ACTIVITIES: CPT

## 2017-12-06 PROCEDURE — 6370000000 HC RX 637 (ALT 250 FOR IP): Performed by: HOSPITALIST

## 2017-12-06 PROCEDURE — 2580000003 HC RX 258: Performed by: HOSPITALIST

## 2017-12-06 PROCEDURE — 99223 1ST HOSP IP/OBS HIGH 75: CPT | Performed by: INTERNAL MEDICINE

## 2017-12-06 PROCEDURE — G8980 MOBILITY D/C STATUS: HCPCS

## 2017-12-06 PROCEDURE — 97161 PT EVAL LOW COMPLEX 20 MIN: CPT

## 2017-12-06 PROCEDURE — G8979 MOBILITY GOAL STATUS: HCPCS

## 2017-12-06 PROCEDURE — 2580000003 HC RX 258: Performed by: STUDENT IN AN ORGANIZED HEALTH CARE EDUCATION/TRAINING PROGRAM

## 2017-12-06 PROCEDURE — 90686 IIV4 VACC NO PRSV 0.5 ML IM: CPT | Performed by: STUDENT IN AN ORGANIZED HEALTH CARE EDUCATION/TRAINING PROGRAM

## 2017-12-06 PROCEDURE — 94762 N-INVAS EAR/PLS OXIMTRY CONT: CPT

## 2017-12-06 RX ORDER — VERAPAMIL HYDROCHLORIDE 120 MG/1
120 TABLET, FILM COATED ORAL NIGHTLY
Qty: 30 TABLET | Refills: 3 | Status: SHIPPED | OUTPATIENT
Start: 2017-12-06

## 2017-12-06 RX ORDER — VERAPAMIL HYDROCHLORIDE 120 MG/1
120 TABLET, FILM COATED ORAL NIGHTLY
Qty: 30 TABLET | Refills: 3 | Status: CANCELLED | OUTPATIENT
Start: 2017-12-06

## 2017-12-06 RX ORDER — ASPIRIN 81 MG/1
81 TABLET ORAL DAILY
Qty: 30 TABLET | Refills: 3 | Status: SHIPPED | OUTPATIENT
Start: 2017-12-07

## 2017-12-06 RX ORDER — VERAPAMIL HYDROCHLORIDE 120 MG/1
120 TABLET, FILM COATED ORAL NIGHTLY
Status: DISCONTINUED | OUTPATIENT
Start: 2017-12-06 | End: 2017-12-06 | Stop reason: HOSPADM

## 2017-12-06 RX ORDER — ATORVASTATIN CALCIUM 40 MG/1
40 TABLET, FILM COATED ORAL NIGHTLY
Qty: 30 TABLET | Refills: 3 | Status: SHIPPED | OUTPATIENT
Start: 2017-12-06

## 2017-12-06 RX ADMIN — INFLUENZA VIRUS VACCINE 0.5 ML: 15; 15; 15; 15 SUSPENSION INTRAMUSCULAR at 17:30

## 2017-12-06 RX ADMIN — SODIUM CHLORIDE: 9 INJECTION, SOLUTION INTRAVENOUS at 03:52

## 2017-12-06 RX ADMIN — ENOXAPARIN SODIUM 40 MG: 40 INJECTION SUBCUTANEOUS at 09:53

## 2017-12-06 RX ADMIN — SODIUM CHLORIDE, PRESERVATIVE FREE 10 ML: 5 INJECTION INTRAVENOUS at 09:53

## 2017-12-06 RX ADMIN — ASPIRIN 81 MG: 81 TABLET, COATED ORAL at 09:54

## 2017-12-06 RX ADMIN — ATORVASTATIN CALCIUM 40 MG: 40 TABLET, FILM COATED ORAL at 00:08

## 2017-12-06 RX ADMIN — VANCOMYCIN HYDROCHLORIDE 1250 MG: 1 INJECTION, POWDER, LYOPHILIZED, FOR SOLUTION INTRAVENOUS at 09:50

## 2017-12-06 RX ADMIN — VANCOMYCIN HYDROCHLORIDE 1250 MG: 1 INJECTION, POWDER, LYOPHILIZED, FOR SOLUTION INTRAVENOUS at 02:15

## 2017-12-06 ASSESSMENT — PAIN SCALES - GENERAL: PAINLEVEL_OUTOF10: 0

## 2017-12-06 NOTE — CARE COORDINATION
Discharge [facility] Case Management Department  Written by: Webb Olszewski, RN    Patient Name: Osmel Call  Attending Provider: No att. providers found  Admit Date: 2017  4:22 PM  MRN: [id number]  Account: [de-identified]                     : 1980  Discharge Date: 2017      Disposition: home    Webb Olszewski, RN

## 2017-12-06 NOTE — CONSULTS
sodium chloride flush, acetaminophen, HYDROcodone 5 mg - acetaminophen    Objective:   /76   Pulse 95   Temp 97.1 °F (36.2 °C) (Oral)   Resp 14   Ht 5' 8\" (1.727 m)   Wt 221 lb 9 oz (100.5 kg)   SpO2 95%   BMI 33.69 kg/m²     Blood pressure range: Systolic (99MXI), VPN:881 , Min:126 , XZ   ; Diastolic (45EHI), FWI:40, Min:76, Max:112        NEUROLOGIC EXAMINATION  GENERAL  Appears comfortable and in no distress   HEENT  NC/ AT   Cardio vascular   S1 and S2 heard; radial pulse intact   NECK  Supple and no bruits heard; No signs of meningism. MENTAL STATUS:  Alert, oriented, intact memory, no confusion, normal speech, normal language, no hallucination or delusion   CRANIAL NERVES: II     -      PERRLA, VA 20/30 OU; discs: Intact venous pulsations;  Visual fields intact to confrontation  III,IV,VI -  EOMs full, no afferent defect, no LYNN, no ptosis  V     -     Normal facial sensation   VII    -     Normal facial symmetry  VIII   -     Intact hearing   IX,X -     Symmetrical palate  XI    -     Symmetrical shoulder shrug  XII   -     Midline tongue, no atrophy    MOTOR FUNCTION:  significant for good strength of grade 5/5 in bilateral proximal and distal muscle groups of both upper and lower extremities with normal bulk, normal tone and no involuntary movements, no tremor   SENSORY FUNCTION:  Normal touch, normal pin, normal vibration, normal proprioception   CEREBELLAR FUNCTION:  Intact fine motor control over upper limbs   REFLEX FUNCTION:  Symmetric, no perverted reflex, no Babinski sign   STATION and GAIT  Not tested         Data:    Lab Results:   CBC:   Recent Labs      17   1710   WBC  10.4   HGB  15.6   PLT  211     BMP:    Recent Labs      17   1637  17   1710  17   0543   NA   --   132*  134*   K   --   3.8  3.8   CL   --   96*  100   CO2   --   21  22   BUN   --   15  8   CREATININE  0.93  0.77  0.84   GLUCOSE   --   99  102*         Lab Results   Component Value

## 2017-12-06 NOTE — H&P
40 mg Oral Nightly    vancomycin  1,250 mg Intravenous Q8H    vancomycin (VANCOCIN) intermittent dosing (placeholder)   Other RX Placeholder    sodium chloride flush  10 mL Intravenous 2 times per day    enoxaparin  40 mg Subcutaneous Daily     Continuous Infusions:   sodium chloride 100 mL/hr at 12/05/17 1750     PRN Meds:magnesium hydroxide, ondansetron, sodium chloride flush, acetaminophen, HYDROcodone 5 mg - acetaminophen    No Known Allergies    SOCIAL HISTORY:   Social History     Social History    Marital status: Single     Spouse name: N/A    Number of children: N/A    Years of education: N/A     Occupational History    Not on file. Social History Main Topics    Smoking status: Never Smoker    Smokeless tobacco: Never Used    Alcohol use Not on file    Drug use: No    Sexual activity: Not on file     Other Topics Concern    Not on file     Social History Narrative    No narrative on file       FAMILY HISTORY:   No family history on file. REVIEW OF SYSTEMS:  · Constitutional: Negative for Fever, chills  · Eyes: Negative for visual changes, diplopia  · ENT: Negative for mouth sores, sore throat. · Cardiovascular: Negative for lightheadedness ,chest pain, palpitations   · Respiratory:Negative for Shortness of breath,cough or wheezing. · Gastrointestinal: Negative for nausea/vomiting, change in bowel habits, abdominal pain  · Genitourinary:Negative for change in bladder habits, dysuria, hematuria. · Musculoskeletal: Negative for joint pain   · Neurological: Positive for numbness and weakness left upper and lower extremities.    · Psychiatric: negative for change in mood, affect      PHYSICAL EXAM:  BP (!) 151/85   Pulse 100   Temp 99 °F (37.2 °C) (Oral)   Resp 18   Ht 5' 8\" (1.727 m)   Wt 221 lb 9 oz (100.5 kg)   SpO2 98%   BMI 33.69 kg/m²    Wt Readings from Last 3 Encounters:   12/05/17 221 lb 9 oz (100.5 kg)       · General appearance: awake, alert, cooperative  · HEENT: Head: Normocephalic, no lesions, without obvious abnormality. · Lungs: clear to auscultation bilaterally  · Heart: regular rate and rhythm, S1, S2 normal, no murmur  · Abdomen: soft, non-tender; bowel sounds normal; no masses,  no organomegaly  · Extremities: +4/5 muscle strength for LUE and LLE. · Neurological:  Awake, alert, oriented to name, place and time. Cranial nerves II-XII are grossly intact. Reflexes normal and symmetric. Sensation grossly normal  · Eye no icterus no redness  · Psych-normal affect     LABS:  CBC:   Recent Labs      12/05/17 1710   WBC  10.4   RBC  5.28   HGB  15.6   HCT  47.0   MCV  89.0   RDW  12.9   PLT  211     BMP:   Recent Labs      12/05/17   1637  12/05/17 1710   NA   --   132*   K   --   3.8   CL   --   96*   CO2   --   21   BUN   --   15   CREATININE  0.93  0.77     ANEMIA STUDIES  No results for input(s): LABIRON, TIBC, FERRITIN, CAEEKECS94, FOLATE, OCCULTBLD in the last 72 hours. BNP: No results for input(s): BNP in the last 72 hours. PT/INR:   Recent Labs      12/05/17 1710   PROTIME  11.4   INR  1.1     APTT:   Recent Labs      12/05/17 1710   APTT  31.8*     CARDIAC ENZYMES:   Recent Labs      12/05/17 1710   CKMB  1.1     FASTING LIPID PANEL:No results found for: CHOL, HDL, TRIG  LFTS  No results for input(s): ALKPHOS, ALT, AST, BILITOT, BILIDIR, LABALBU in the last 72 hours. AMYLASE/LIPASE/AMMONIA  No results for input(s): AMYLASE, LIPASE, AMMONIA in the last 72 hours. ASSESSMENT:     Patient Active Problem List    Diagnosis Date Noted    Acute left-sided weakness 12/05/2017          PLAN:     ASA 81mg daily  Goal -180mm Hg  Lipitor 40mg  F/up carotid dopplers  Neurology consult  F/up pan cultures and 2D echo.  If negative will consider stopping antibiotic therapy   F/up TSH, HgA1C, Fasting lipid, BMP    DVT prophylaxis: Lovenox  Diet: General  PT/OT evaluation  Discharge planning: Social work    Randell Ford, PGY-1  Internal Medicine

## 2017-12-06 NOTE — ED NOTES
Patient resting on cot even respirations unlabored no signs of distress. Patient denies pain currently. No questions or concerns updated on plan will continue to monitor.      II Shellie Serrano RN  12/05/17 2463

## 2017-12-06 NOTE — PROGRESS NOTES
Smoking Cessation - topics covered   []  Health Risks  []  Benefits of Quitting   []  Smoking Cessation  [x]  Patient has no history of tobacco use  []  Patient is former smoker. [x]  No need for tobacco cessation education. []  Booklet given  []  Patient verbalizes understanding. []  Patient denies need for tobacco cessation education.   Carli Langley  8:35 AM

## 2017-12-06 NOTE — PROGRESS NOTES
[facility] [address]  Occupational Therapy Not Seen Note    Patient not available for Occupational Therapy due to:    [] Testing:    [] Hemodialysis    [] Blood Transfusion in Progress    []Refusal by Patient:    [] Surgery/Procedure:    [] Strict Bedrest    [] Sedation    [] Spine Precautions     [] Pt being transferred to palliative care at this time. Spoke with pt/family and OT services to be defered. [x] Pt independent with functional mobility and functional tasks. Pt with no OT acute care needs at this time, will defer OT eval.    [] Other    Next Scheduled Treatment: N/A    Signature:  Viri Reddy OTR/TAMIA

## 2017-12-06 NOTE — PROGRESS NOTES
Physical Therapy    Facility/Department: [facility] NEURO  Initial Assessment    NAME: Franck Stahl  : 1980  MRN: [id number]    Date of Service: 2017  History obtained from medical records on 17: Franck Stahl is a 30 y.o. male who presented to the emergency department with Weakness to the entire left side of his body. The patient reports that he was driving from [address] [place] [address] to [facility] again feel very ill, pulled off the side of the road, and believes he passed out for a few hours. The patient states that he was last well at around 10:30 this morning. On arrival by EMS today they noted that the patient had left-sided deficits to both upper and lower extremities with some confusion. On arrival to the emergency department the patient is awake, alert, and oriented with weakness to the left lower extremity and some weakness to the left upper. There is no facial droop. Patient Diagnosis(es): The encounter diagnosis was Left-sided weakness. has no past medical history on file. has no past surgical history on file. Restrictions  Restrictions/Precautions  Restrictions/Precautions: General Precautions  Required Braces or Orthoses?: No  Position Activity Restriction  Other position/activity restrictions: Up with assistance  Vision/Hearing  Vision: Impaired  Vision Exceptions: Wears glasses at all times  Hearing: Within functional limits     Subjective  General  Chart Reviewed: Yes  Patient assessed for rehabilitation services?: Yes  Family / Caregiver Present: Yes (mother, sister, nephew)  Follows Commands: Within Functional Limits  Pain Screening  Patient Currently in Pain: Denies  Vital Signs  Patient Currently in Pain: Denies   General / Subjective: Patient supine in bed upon arrival for assessment. Patient and RN agreeable to PT on this date. Patient denies pain at rest prior to treatment session. Prior to session, patient's BP was 138/82.      Orientation  Orientation  Overall Orientation Status: Within Functional Limits    Social/Functional History  Social/Functional History  Lives With: Family (mother, sister, nephew)  Type of Home: House  Home Layout: Two level (main level and basement. pt lives in basement. 10 steps to basement w/ R handrail)  Home Access: Level entry  Bathroom Shower/Tub: Tub/Shower unit  ADL Assistance: Independent  Homemaking Assistance: Independent  Homemaking Responsibilities: Yes  Ambulation Assistance: Independent  Transfer Assistance: Independent  Active : Yes  Mode of Transportation: Car  Occupation: Part time employment  Objective     Observation/Palpation  Posture: Good    AROM RLE (degrees)  RLE AROM: WFL  AROM LLE (degrees)  LLE AROM : WFL  AROM RUE (degrees)  RUE AROM : WFL  AROM LUE (degrees)  LUE AROM : WFL  Strength RLE  Strength RLE: WFL  Comment: grossly 5/5  Strength LLE  Strength LLE: WFL  Comment: grossly 5/5  Strength RUE  Strength RUE: WFL  Comment: grossly 5/5  Strength LUE  Strength LUE: WFL  Comment: grossly 5/5  Motor Control  Gross Motor?: WFL  Sensation  Overall Sensation Status: WFL  Bed mobility  Rolling to Left: Independent  Rolling to Right: Independent  Supine to Sit: Independent  Sit to Supine: Independent  Scooting: Independent  Transfers  Sit to Stand: Modified independent  Stand to sit: Modified independent  Ambulation  Ambulation?: Yes  Ambulation 1  Surface: level tile  Assistance: Stand by assistance  Quality of Gait: normal step and stride length as well as sergio  Distance: 300ft  Stairs/Curb  Stairs?: Yes  Stairs  # Steps : 2  Stairs Height: 4\"  Rails: Right ascending  Device: No Device  Assistance: Stand by assistance     Balance  Posture: Good  Sitting - Static: Good  Sitting - Dynamic: Good  Standing - Static: Good  Standing - Dynamic: Good        Assessment   Assessment: Patient tolerated assessment well.  Patient was independent for bed mobility, Saskia for transfers, SBA for gait 300ft no device, and SBA 2 steps using R

## 2017-12-11 LAB
CULTURE: NORMAL
Lab: NORMAL
SPECIMEN DESCRIPTION: NORMAL
STATUS: NORMAL